# Patient Record
Sex: FEMALE | Race: WHITE | NOT HISPANIC OR LATINO
[De-identification: names, ages, dates, MRNs, and addresses within clinical notes are randomized per-mention and may not be internally consistent; named-entity substitution may affect disease eponyms.]

---

## 2018-05-03 ENCOUNTER — HOSPITAL (OUTPATIENT)
Dept: OTHER | Age: 43
End: 2018-05-03
Attending: OBSTETRICS & GYNECOLOGY

## 2018-06-14 ENCOUNTER — HOSPITAL (OUTPATIENT)
Dept: OTHER | Age: 43
End: 2018-06-14
Attending: OBSTETRICS & GYNECOLOGY

## 2018-11-30 ENCOUNTER — HOSPITAL (OUTPATIENT)
Dept: OTHER | Age: 43
End: 2018-11-30

## 2021-04-20 ENCOUNTER — APPOINTMENT (OUTPATIENT)
Dept: URBAN - METROPOLITAN AREA CLINIC 220 | Age: 46
Setting detail: DERMATOLOGY
End: 2021-04-20

## 2021-04-20 DIAGNOSIS — L82.1 OTHER SEBORRHEIC KERATOSIS: ICD-10-CM

## 2021-04-20 DIAGNOSIS — L98.8 OTHER SPECIFIED DISORDERS OF THE SKIN AND SUBCUTANEOUS TISSUE: ICD-10-CM

## 2021-04-20 DIAGNOSIS — L81.4 OTHER MELANIN HYPERPIGMENTATION: ICD-10-CM

## 2021-04-20 PROCEDURE — OTHER MIPS QUALITY: OTHER

## 2021-04-20 PROCEDURE — OTHER COUNSELING: OTHER

## 2021-04-20 PROCEDURE — OTHER BOTOX: OTHER

## 2021-04-20 PROCEDURE — 99203 OFFICE O/P NEW LOW 30 MIN: CPT

## 2021-04-20 PROCEDURE — OTHER REASSURANCE: OTHER

## 2021-04-20 ASSESSMENT — LOCATION DETAILED DESCRIPTION DERM
LOCATION DETAILED: LEFT VENTRAL DISTAL FOREARM
LOCATION DETAILED: RIGHT PROXIMAL DORSAL FOREARM
LOCATION DETAILED: LEFT DISTAL DORSAL FOREARM

## 2021-04-20 ASSESSMENT — LOCATION ZONE DERM: LOCATION ZONE: ARM

## 2021-04-20 ASSESSMENT — LOCATION SIMPLE DESCRIPTION DERM
LOCATION SIMPLE: LEFT FOREARM
LOCATION SIMPLE: RIGHT FOREARM

## 2021-09-09 ENCOUNTER — APPOINTMENT (OUTPATIENT)
Dept: URBAN - METROPOLITAN AREA CLINIC 220 | Age: 46
Setting detail: DERMATOLOGY
End: 2021-09-09

## 2021-09-09 VITALS — TEMPERATURE: 98 F

## 2021-09-09 DIAGNOSIS — L98.8 OTHER SPECIFIED DISORDERS OF THE SKIN AND SUBCUTANEOUS TISSUE: ICD-10-CM

## 2021-09-09 PROCEDURE — OTHER MIPS QUALITY: OTHER

## 2021-09-09 PROCEDURE — OTHER BOTOX: OTHER

## 2021-09-09 PROCEDURE — OTHER COUNSELING: OTHER

## 2021-09-09 NOTE — PROCEDURE: MIPS QUALITY
Quality 431: Preventive Care And Screening: Unhealthy Alcohol Use - Screening: Patient not identified as an unhealthy alcohol user when screened for unhealthy alcohol use using a systematic screening method
Quality 431: Preventive Care And Screening: Unhealthy Alcohol Use - Screening: Patient screened for unhealthy alcohol use using a single question and scores less than 2 times per year
Quality 130: Documentation Of Current Medications In The Medical Record: Current Medications Documented
Quality 226: Preventive Care And Screening: Tobacco Use: Screening And Cessation Intervention: Patient screened for tobacco use and is an ex/non-smoker
Quality 110: Preventive Care And Screening: Influenza Immunization: Influenza Immunization previously received during influenza season
Detail Level: Detailed

## 2022-01-14 ENCOUNTER — APPOINTMENT (OUTPATIENT)
Dept: URBAN - METROPOLITAN AREA CLINIC 220 | Age: 47
Setting detail: DERMATOLOGY
End: 2022-01-14

## 2022-01-14 DIAGNOSIS — L98.8 OTHER SPECIFIED DISORDERS OF THE SKIN AND SUBCUTANEOUS TISSUE: ICD-10-CM

## 2022-01-14 DIAGNOSIS — L72.0 EPIDERMAL CYST: ICD-10-CM

## 2022-01-14 PROCEDURE — OTHER ACNE SURGERY COSMETIC: OTHER

## 2022-01-14 PROCEDURE — OTHER BOTOX: OTHER

## 2022-01-14 PROCEDURE — OTHER COUNSELING: OTHER

## 2022-01-14 PROCEDURE — OTHER MIPS QUALITY: OTHER

## 2022-01-14 ASSESSMENT — LOCATION SIMPLE DESCRIPTION DERM
LOCATION SIMPLE: RIGHT CHEEK
LOCATION SIMPLE: RIGHT NOSE
LOCATION SIMPLE: LEFT FOREHEAD

## 2022-01-14 ASSESSMENT — LOCATION DETAILED DESCRIPTION DERM
LOCATION DETAILED: RIGHT NASAL SIDEWALL
LOCATION DETAILED: LEFT MEDIAL FOREHEAD
LOCATION DETAILED: RIGHT CENTRAL MALAR CHEEK
LOCATION DETAILED: RIGHT MEDIAL MALAR CHEEK

## 2022-01-14 ASSESSMENT — LOCATION ZONE DERM
LOCATION ZONE: FACE
LOCATION ZONE: NOSE

## 2022-01-14 NOTE — HPI: COSMETIC (BOTOX)
Have You Had Botox Before?: has had botox
Additional History: Patient states pain level of 0/10
When Was Your Last Botox Treatment?: 09/09/2021

## 2022-01-14 NOTE — PROCEDURE: ACNE SURGERY COSMETIC
Prep Text (Optional): Prior to removal the treatment areas were prepped in the usual fashion.
Render Post-Care Instructions In Note?: no
Consent was obtained and risks were reviewed including but not limited to scarring, infection, bleeding, scabbing, incomplete removal, and allergy to anesthesia.
Acne Type: Comedonal Lesions
Post-Care Instructions: I reviewed with the patient in detail post-care instructions. Patient is to keep the treatment areas dry overnight, and then apply bacitracin twice daily until healed. Patient may apply hydrogen peroxide soaks to remove any crusting.
Extraction Method: 11 blade and q-tip
Render Number Of Lesions Treated: yes
Detail Level: Simple

## 2022-01-14 NOTE — HPI: CYST (MILIA)
Is This A New Presentation, Or A Follow-Up?: Cysts
Additional History: Patient states pain level of 0/10

## 2022-07-26 ENCOUNTER — APPOINTMENT (OUTPATIENT)
Dept: URBAN - METROPOLITAN AREA CLINIC 220 | Age: 47
Setting detail: DERMATOLOGY
End: 2022-07-31

## 2022-07-26 DIAGNOSIS — L98.8 OTHER SPECIFIED DISORDERS OF THE SKIN AND SUBCUTANEOUS TISSUE: ICD-10-CM

## 2022-07-26 PROCEDURE — OTHER MIPS QUALITY: OTHER

## 2022-07-26 PROCEDURE — OTHER BOTOX: OTHER

## 2022-07-26 PROCEDURE — OTHER COUNSELING: OTHER

## 2022-07-26 ASSESSMENT — LOCATION ZONE DERM: LOCATION ZONE: FACE

## 2022-07-26 ASSESSMENT — LOCATION SIMPLE DESCRIPTION DERM: LOCATION SIMPLE: INFERIOR FOREHEAD

## 2022-07-26 ASSESSMENT — LOCATION DETAILED DESCRIPTION DERM: LOCATION DETAILED: INFERIOR MID FOREHEAD

## 2022-07-26 NOTE — HPI: COSMETIC (BOTOX)
Have You Had Botox Before?: has had botox
Additional History: Patient states pain level of 0/10
When Was Your Last Botox Treatment?: 01/2022

## 2022-07-26 NOTE — PROCEDURE: BOTOX
Patient Specific Comments (Will Not Stick From Patient To Patient): 3 units added to left lateral brow